# Patient Record
Sex: FEMALE | Race: WHITE
[De-identification: names, ages, dates, MRNs, and addresses within clinical notes are randomized per-mention and may not be internally consistent; named-entity substitution may affect disease eponyms.]

---

## 2023-08-02 ENCOUNTER — HOSPITAL ENCOUNTER (EMERGENCY)
Facility: HOSPITAL | Age: 48
Discharge: HOME | End: 2023-08-02
Attending: EMERGENCY MEDICINE

## 2023-08-02 VITALS
HEART RATE: 84 BPM | TEMPERATURE: 97.9 F | OXYGEN SATURATION: 99 % | WEIGHT: 125 LBS | RESPIRATION RATE: 20 BRPM | SYSTOLIC BLOOD PRESSURE: 118 MMHG | DIASTOLIC BLOOD PRESSURE: 68 MMHG | HEIGHT: 69 IN | BODY MASS INDEX: 18.51 KG/M2

## 2023-08-02 DIAGNOSIS — T50.904A DRUG OVERDOSE OF UNDETERMINED INTENT, INITIAL ENCOUNTER: Primary | ICD-10-CM

## 2023-08-02 LAB — HCG UR QL: NEGATIVE

## 2023-08-02 PROCEDURE — 81025 URINE PREGNANCY TEST: CPT

## 2023-08-02 PROCEDURE — 99284 EMERGENCY DEPT VISIT MOD MDM: CPT

## 2023-08-02 PROCEDURE — 6360000002 HC RX W HCPCS: Performed by: EMERGENCY MEDICINE

## 2023-08-02 PROCEDURE — 96372 THER/PROPH/DIAG INJ SC/IM: CPT

## 2023-08-02 RX ORDER — LORAZEPAM 2 MG/ML
2 INJECTION INTRAMUSCULAR ONCE
Status: COMPLETED | OUTPATIENT
Start: 2023-08-02 | End: 2023-08-02

## 2023-08-02 RX ADMIN — LORAZEPAM 2 MG: 2 INJECTION INTRAMUSCULAR; INTRAVENOUS at 00:59

## 2023-08-02 ASSESSMENT — PAIN DESCRIPTION - FREQUENCY: FREQUENCY: CONTINUOUS

## 2023-08-02 ASSESSMENT — LIFESTYLE VARIABLES
HOW OFTEN DO YOU HAVE A DRINK CONTAINING ALCOHOL: 2-3 TIMES A WEEK
HOW MANY STANDARD DRINKS CONTAINING ALCOHOL DO YOU HAVE ON A TYPICAL DAY: 3 OR 4

## 2023-08-02 ASSESSMENT — PAIN SCALES - GENERAL: PAINLEVEL_OUTOF10: 8

## 2023-08-02 ASSESSMENT — PAIN - FUNCTIONAL ASSESSMENT: PAIN_FUNCTIONAL_ASSESSMENT: 0-10

## 2023-08-02 NOTE — ED NOTES
Pt medicated per MAR resting on stretcher , eyes closed. Cardiac monitoring in place.  [place] placed for comfort       Neel Mooney RN  08/02/23 [id number]

## 2023-08-02 NOTE — ED NOTES
Pt at bedside on knees unsteady, speech slurred. Pt somnolent ,anxious, agitated and speaking in aggressive tone. Pt fell backwards on bed assisted pt to sitting position pt requesting not to be touched. Provider made aware orders placed pt allergies verified pt medicated per MAR. Pt placed on cardiac monitoring, SPO2 91 on RA placed on 2L NC 98%. Pt resting yes closed on stretcher.       Irma Aburto RN  08/02/23 [id number]

## 2023-08-02 NOTE — ED NOTES
Pt ambulatory w/assistance to bathroom. Pt voids freely w/o incident, urine sample obtained and sent to lab. Pt walking in room gait unsteady, eyes closed. Aggressive tone , cursing at staff. Pt asked to sit down for safety concerns pt refused. Security called to bedside. Pt walking department w/unsteady gait, falling backwards. Pt refused to stay in room.       Royal Dalila RN  08/02/23 [id number]

## 2023-08-02 NOTE — ED NOTES
Pt anxious, agitated , aggressive tone. Pt sitting up in bed on knees asked several time to lay back in bed pt refused. Pt fell backwards attempted to help pt to siting position pt became aggressive. Provider made aware orders placed pt medicated per MAR. Cardiac monitor in place , Pt placed on 2L NC SPO2 96%.      Armaan Olson RN  08/02/23 [id number]

## 2023-08-02 NOTE — ED NOTES
Pt resting eyes closed  NAD, VSS 2L NC SPO2 99%. Seizure pads in place. Cardiac monitor in place.       Marci Resendiz RN  08/02/23 [id number]

## 2023-08-02 NOTE — ED PROVIDER NOTES
EMERGENCY DEPARTMENT HISTORY AND PHYSICAL EXAM    12:26 AM EDT seen on arrival in room 6        Date: 8/2/2023  Patient Name: Gilbert Perdomo    History of Presenting Illness     No chief complaint on file. History Provided By: EMS    Additional History (Context): Gilbert Perdomo is a 53 y.o. female presents with patient was found lying in somebody's driveway. Somnolent. Narcan intranasal given 1 mg. Transported. She admits to taking 2 beers and 2 Soma tablets. She arrives to us a bit agitated, squirming about on the bed. Follows commands cooperates with the exam.    PCP: No primary care provider on file. Chief Complaint:   Duration:    Timing:    Location:   Quality:   Severity:   Modifying Factors:   Associated Symptoms:       No current facility-administered medications for this encounter. No current outpatient medications on file. Past History     Past Medical History:  No past medical history on file. Past Surgical History:  No past surgical history on file. Family History:  No family history on file. Social History: Allergies:  Not on File      Review of Systems     Review of Systems      Physical Exam       No data found. IPVITALS  No data found. Physical Exam  Constitutional:       Appearance: Normal appearance. HENT:      Head: Normocephalic and atraumatic. Eyes:      Extraocular Movements: Extraocular movements intact. Comments: Small pupils   Cardiovascular:      Rate and Rhythm: Normal rate and regular rhythm. Pulmonary:      Effort: Pulmonary effort is normal.      Breath sounds: Normal breath sounds. Abdominal:      Tenderness: There is no abdominal tenderness. Musculoskeletal:         General: Normal range of motion. Cervical back: Normal range of motion and neck supple. Skin:     General: Skin is warm and dry. Neurological:      General: No focal deficit present. Mental Status: She is alert.       Cranial Nerves: No cranial

## 2023-08-02 NOTE — DISCHARGE INSTRUCTIONS
You were evaluated for intoxication . Based on your work-up it was deemed that she was stable for discharge. Please follow-up with your primary care physician if you have any further concerns and go over your work-up. If you experience any chest pain, shortness of breath, worsening abdominal pain, vomiting blood, worsening headache, seizures, or any worsening of your symptoms please return to the emergency department immediately. If you have any pending results or any further questions please contact the emergency department at [phone].

## 2023-08-02 NOTE — ED TRIAGE NOTES
Pt to ED via EMS pt was lying in neighbor yard. P reports ETOH and soma x 2 doses. Pt also endorses Nausea, vomiting, CP, SOB.  Pain 8/10 neck and back